# Patient Record
(demographics unavailable — no encounter records)

---

## 2020-08-18 NOTE — RAD REPORT
EXAM DESCRIPTION:  CT - Head Brain Wo Cont - 8/18/2020 8:53 am

 

CLINICAL HISTORY:  Head injury, LOC

Headache, drowsiness

 

COMPARISON:  No comparisons

 

TECHNIQUE:  All CT scans are performed using dose optimization technique as appropriate and may inclu
de automated exposure control or mA/KV adjustment according to patient size.

 

FINDINGS:  No intracranial hemorrhage, hydrocephalus or extra-axial fluid collection.No areas of brai
n edema or evidence of midline shift.

 

The paranasal sinuses and mastoids are clear. The calvarium is intact.

 

IMPRESSION:  No acute intracranial abnormality.

## 2020-08-18 NOTE — ER
Nurse's Notes                                                                                     

 [facility]                                                                 

Name: Roel Gil Jr                                                                           

Age: 18 yrs                                                                                       

Sex: Male                                                                                         

: 2002                                                                                   

MRN: [id number]                                                                                   

Arrival Date: 2020                                                                          

Time: 08:36                                                                                       

Account#: [id number]                                                                            

Bed 15                                                                                            

Private MD:                                                                                       

Diagnosis: Unspecified injury of head;Laceration without foreign body of scalp                    

                                                                                                  

Presentation:                                                                                     

                                                                                             

08:37 Chief complaint: Patient states: Bent over to get a towel and hit his head on the door  rb1 

      knob when he was getting up. Pt. reports LOC briefly.                                       

08:37 Coronavirus screen: Client denies travel out of the [place] in the last 14 days. At this   rb1 

      time, the client does not indicate any symptoms associated with coronavirus-19. Ebola       

      Screen: Patient denies travel to an Ebola-affected area in the 21 days before illness       

      onset. Mechanism of Injury: resulted from door knob. Initial Sepsis Screen: Does the        

      patient meet any 2 criteria? No. Patient's initial sepsis screen is negative. Does the      

      patient have a suspected source of infection? Yes: Skin breakdown/wound. Risk               

      Assessment: Do you want to hurt yourself or someone else? Patient reports no desire to      

      harm self or others.                                                                        

08:37 Method Of Arrival: Ambulatory                                                           rb1 

08:37 Acuity: SALVADOR 3                                                                           rb1 

08:37 Onset of symptoms was 2020.                                                  rb1 

                                                                                                  

Triage Assessment:                                                                                

08:37 General: Appears in no apparent distress. comfortable, Behavior is calm, cooperative.   rb1 

      Neuro: Level of Consciousness is awake, alert, obeys commands, Oriented to person,          

      place, time, situation, Reports brief LOC. Cardiovascular: Capillary refill < 3             

      seconds. Respiratory: Airway is patent Respiratory effort is even, unlabored,               

      Respiratory pattern is regular, symmetrical. GI: No signs and/or symptoms were reported     

      involving the gastrointestinal system. : No signs and/or symptoms were reported           

      regarding the genitourinary system. Derm: Wound noted left side of the back of head         

      Wound is No bleeding noted at this time. Musculoskeletal: Range of motion: intact in        

      all extremities.                                                                            

08:37 Pain: Denies pain.                                                                      rb1 

                                                                                                  

Historical:                                                                                       

- Allergies:                                                                                      

08:45 No Known Allergies;                                                                     rb1 

- Home Meds:                                                                                      

08:45 None [Active];                                                                          rb1 

- PMHx:                                                                                           

08:45 None;                                                                                   rb1 

- PSHx:                                                                                           

08:45 None;                                                                                   rb1 

                                                                                                  

- Immunization history:: Adult Immunizations up to date, Last tetanus immunization: up            

  to date.                                                                                        

- Social history:: Smoking status: Patient/guardian denies using.                                 

                                                                                                  

                                                                                                  

Screenin:37 Abuse screen: Denies threats or abuse. Nutritional screening: No deficits noted.        rb1 

      Tuberculosis screening: No symptoms or risk factors identified. Fall Risk None              

      identified.                                                                                 

                                                                                                  

Assessment:                                                                                       

08:37 General: See triage assessment.                                                         rb1 

09:30 Reassessment: Patient appears in no apparent distress at this time. Patient and/or      rb1 

      family updated on plan of care and expected duration. Pain level reassessed. Patient is     

      alert, oriented x 3, equal unlabored respirations, skin warm/dry/pink.                      

                                                                                                  

Vital Signs:                                                                                      

08:37  / 69; Pulse 69; Resp 17; Temp 98.2; Pulse Ox 100% ; Weight 75.75 kg; Height 5    rb1 

      ft. 8 in. (172.72 cm);                                                                      

08:37 Body Mass Index 25.39 (75.75 kg, 172.72 cm)                                             rb1 

                                                                                                  

[place] Coma Score:                                                                               

08:37 Eye Response: spontaneous(4). Verbal Response: oriented(5). Motor Response: obeys       rb1 

      commands(6). Total: 15.                                                                     

08:46 Eye Response: spontaneous(4). Verbal Response: oriented(5). Motor Response: obeys       pm1 

      commands(6). Total: 15.                                                                     

                                                                                                  

ED Course:                                                                                        

08:36 Patient arrived in ED.                                                                  ag5 

08:37 Nanette Valenzuela, RN is Primary Nurse.                                                   iw  

08:37 Farhat Middleton NP is PHCP.                                                           pm1 

08:37 Gilberto James MD is Attending Physician.                                                pm1 

08:37 Patient has correct armband on for positive identification. Bed in low position. Call   rb1 

      light in reach. Side rails up X 1. Pulse ox on. NIBP on.                                    

08:42 Velma Beckman, RN is Primary Nurse.                                                   rb1 

08:45 Triage completed.                                                                       rb1 

08:45 Arm band placed on right wrist.                                                         rb1 

08:53 CT Head Brain wo Cont In Process Unspecified.                                           EDMS

09:30 Assist provider with laceration repair on left side of the back of head that was        iw  

      between 2.6 to 7.5 cm using staples. Set up tray. Performed by Farhat Middleton NP           

      Patient tolerated well.                                                                     

10:07 Patient did not have IV access during this emergency room visit.                        rb1 

                                                                                                  

Administered Medications:                                                                         

09:00 Drug: Lidocaine (1 %) 5 ml Volume: 5 ml; Route: Infiltration;                           rb1 

                                                                                                  

                                                                                                  

Outcome:                                                                                          

09:55 Discharge ordered by MD.                                                                pm1 

10:07 Discharged to home ambulatory.                                                          iw  

10:07 Condition: good                                                                             

10:07 Discharge instructions given to patient, Instructed on discharge instructions, follow       

      up and referral plans. Demonstrated understanding of instructions, follow-up care,          

      wound care.                                                                                 

10:12 Patient left the ED.                                                                    iw  

                                                                                                  

Signatures:                                                                                       

Dispatcher [employer]                           Nanette Lemon RN RN   iw                                                   

Velma Beckman RN                     RN   rb1                                                  

Farhat Middleton, NP                    NP   pm1                                                  

Donn Ferreira                                ag5                                                  

                                                                                                  

**************************************************************************************************

## 2020-08-18 NOTE — EDPHYS
Physician Documentation                                                                           

 [facility]                                                                 

Name: Roel Gil Jr                                                                           

Age: 18 yrs                                                                                       

Sex: Male                                                                                         

: 2002                                                                                   

MRN: [id number]                                                                                   

Arrival Date: 2020                                                                          

Time: 08:36                                                                                       

Account#: [id number]                                                                            

Bed 15                                                                                            

Private MD:                                                                                       

ED Physician Gilberto James                                                                         

HPI:                                                                                              

                                                                                             

08:46 This 18 yrs old  Male presents to ER via Ambulatory with complaints of Head     pm1 

      Injury-Adult, Laceration To Head.                                                           

08:46 The patient or guardian reports a laceration, clean. The complaints affect the left     pm1 

      side of the back of head. Context of injury: The problem was sustained at home,             

      resulted from standing up, and hitting a cabinet, knob for the cabinet. Onset: The          

      symptoms/episode began/occurred this morning. Associated signs and symptoms: Loss of        

      consciousness: This patient experience a loss of consciousness, for an unknown period       

      of time, Pertinent negatives: headache, neck pain, vision changes. Severity of              

      symptoms: in the emergency department the symptoms have improved. The patient has not       

      experienced similar symptoms in the past.                                                   

                                                                                                  

Historical:                                                                                       

- Allergies:                                                                                      

08:45 No Known Allergies;                                                                     rb1 

- Home Meds:                                                                                      

08:45 None [Active];                                                                          rb1 

- PMHx:                                                                                           

08:45 None;                                                                                   rb1 

- PSHx:                                                                                           

08:45 None;                                                                                   rb1 

                                                                                                  

- Immunization history:: Adult Immunizations up to date, Last tetanus immunization: up            

  to date.                                                                                        

- Social history:: Smoking status: Patient/guardian denies using.                                 

                                                                                                  

                                                                                                  

ROS:                                                                                              

08:46 Constitutional: Negative for fever, chills, and weight loss, Neck: Negative for injury, pm1 

      pain, and swelling, Cardiovascular: Negative for chest pain, palpitations, and edema,       

      Respiratory: Negative for shortness of breath, cough, wheezing, and pleuritic chest         

      pain, Abdomen/GI: Negative for abdominal pain, nausea, vomiting, diarrhea, and              

      constipation, Back: Negative for injury and pain, MS/Extremity: Negative for injury and     

      deformity, Neuro: Negative for headache, weakness, numbness, tingling, and seizure.         

08:46 Skin: Positive for laceration(s), of the left side of the back of head.                     

                                                                                                  

Exam:                                                                                             

08:46 Constitutional:  This is a well developed, well nourished patient who is awake, alert,  pm1 

      and in no acute distress.                                                                   

08:46 Skin:  Warm, dry with normal turgor.  Normal color with no rashes, no lesions, and no       

      evidence of cellulitis. MS/ Extremity:  Pulses equal, no cyanosis.  Neurovascular           

      intact.  Full, normal range of motion.                                                      

08:46 Neck:  Trachea midline, no thyromegaly or masses palpated, and no cervical                  

      lymphadenopathy.  Supple, full range of motion without nuchal rigidity, or vertebral        

      point tenderness.  No Meningismus.                                                          

08:46 Head/face: Noted is no obvious of injury or deformity except a laceration(s), that is       

      linear, of the  left side of the back of head.                                              

08:46 Cardiovascular: Exam negative for  acute changes, Rate: normal, Rhythm: regular,            

      Pulses: no pulse deficits are appreciated.                                                  

08:46 Respiratory: Exam negative for  acute changes, respiratory distress, shortness of           

      breath.                                                                                     

08:46 Neuro: Exam negative for acute changes, Orientation: is normal, Mentation: is normal,       

      Motor: is normal, moves all fours, Gait: is steady, at a normal pace, without               

      difficulty.                                                                                 

                                                                                                  

Vital Signs:                                                                                      

08:37  / 69; Pulse 69; Resp 17; Temp 98.2; Pulse Ox 100% ; Weight 75.75 kg; Height 5    rb1 

      ft. 8 in. (172.72 cm);                                                                      

08:37 Body Mass Index 25.39 (75.75 kg, 172.72 cm)                                             rb1 

                                                                                                  

[place] Coma Score:                                                                               

08:37 Eye Response: spontaneous(4). Verbal Response: oriented(5). Motor Response: obeys       rb1 

      commands(6). Total: 15.                                                                     

08:46 Eye Response: spontaneous(4). Verbal Response: oriented(5). Motor Response: obeys       pm1 

      commands(6). Total: 15.                                                                     

                                                                                                  

Laceration:                                                                                       

09:55 Wound Repair of 4cm ( 1.6in ) subcutaneous laceration to left side of the back of head. pm1 

      Linear shaped.. Distal neuro/vascular/tendon intact. Anesthesia: Local anesthetic           

      administered with 5 mls of 1% lidocaine. Wound prep: Extensive cleansing with hibiclenz     

      by me, Wound irrigation with saline by me, Wound explored extensively, Copious              

      irrigation. Skin closed with 6 1-0 Staples using staple gun. Patient tolerated well.        

                                                                                                  

MDM:                                                                                              

08:38 Patient medically screened.                                                             pm1 

08:49 Data reviewed: vital signs. Data interpreted: Pulse oximetry: on room air is 100 %.     pm1 

      Interpretation: normal.                                                                     

09:54 Counseling: I had a detailed discussion with the patient and/or guardian regarding: the pm1 

      historical points, exam findings, and any diagnostic results supporting the                 

      discharge/admit diagnosis, radiology results, the need for outpatient follow up, to         

      return to the emergency department if symptoms worsen or persist or if there are any        

      questions or concerns that arise at home, staple removal in 10-14 days.                     

                                                                                                  

                                                                                             

08:40 Order name: CT Head Brain wo Cont; Complete Time: 09:21                                 pm1 

                                                                                                  

Administered Medications:                                                                         

09:00 Drug: Lidocaine (1 %) 5 ml Volume: 5 ml; Route: Infiltration;                           rb1 

                                                                                                  

                                                                                                  

Disposition:                                                                                      

14:27 Co-signature as Attending Physician, Gilberto James MD.                                    rn  

                                                                                                  

Disposition:                                                                                      

20 09:55 Discharged to Home. Impression: Unspecified injury of head, Laceration without     

  foreign body of scalp.                                                                          

- Condition is Stable.                                                                            

- Discharge Instructions: Head Injury, Adult, Stitches, Staples, or Adhesive Wound                

  Closure.                                                                                        

                                                                                                  

- Medication Reconciliation Form, Thank You Letter, Antibiotic Education, Prescription            

  Opioid Use form.                                                                                

- Follow up: Emergency Department; When: As needed; Reason: Worsening of condition.               

  Follow up: Private Physician; When: 10 - 14 days; Reason: Recheck today's complaints,           

  Continuance of care, Staple/Suture removal, Re-evaluation by your physician.                    

- Problem is new.                                                                                 

- Symptoms have improved.                                                                         

                                                                                                  

                                                                                                  

                                                                                                  

Signatures:                                                                                       

Dispatcher [employer]                           EDMS                                                 

Nanette Valenzuela RN RN iw Nieto, Roman, MD MD rn Barber, Rebecca, RN RN   rb1                                                  

Farhat Middleton, FREDRICK                    NP   pm1                                                  

                                                                                                  

Corrections: (The following items were deleted from the chart)                                    

10:12 09:55 2020 09:55 Discharged to Home. Impression: Unspecified injury of head;      iw  

      Laceration without foreign body of scalp. Condition is Stable. Forms are Medication         

      Reconciliation Form, Thank You Letter, Antibiotic Education, Prescription Opioid Use.       

      Follow up: Emergency Department; When: As needed; Reason: Worsening of condition.           

      Follow up: Private Physician; When: 10 - 14 days; Reason: Recheck today's complaints,       

      Continuance of care, Staple/Suture removal, Re-evaluation by your physician. Problem is     

      new. Symptoms have improved. pm1                                                            

                                                                                                  

**************************************************************************************************